# Patient Record
Sex: FEMALE | Race: ASIAN | Employment: FULL TIME | URBAN - METROPOLITAN AREA
[De-identification: names, ages, dates, MRNs, and addresses within clinical notes are randomized per-mention and may not be internally consistent; named-entity substitution may affect disease eponyms.]

---

## 2021-11-15 ENCOUNTER — TELEPHONE (OUTPATIENT)
Dept: FAMILY MEDICINE | Facility: CLINIC | Age: 51
End: 2021-11-15

## 2021-11-15 NOTE — TELEPHONE ENCOUNTER
Patient calling and stating she lives in the [facility] and is needing to complete an Immigration physical soon and is wanting to schedule this with Dr. Smyth. She would like a call in regards to what records she is needing for this physical? Patient is going to have what health information that she has with [facility] faxed to Dr. Smyth to be scanned into the chart. Janki Edmond LPN

## 2021-11-17 NOTE — TELEPHONE ENCOUNTER
Attempted to reach patient. Call was not answered, no voice mail.  An Stewart CMA on 11/17/2021 at 4:05 PM

## 2021-11-23 NOTE — TELEPHONE ENCOUNTER
Patient called back and she is scheduled 12/7 for a immigration physical. She is wondering what she needs for paperwork with. [phone] best number to reach her. She will have primary care MD send her immunization record to us.

## 2021-12-03 NOTE — TELEPHONE ENCOUNTER
Spoke to patient. Information given to patient as to what is needed when she comes in.   An Stewart, DARIO on 12/3/2021 at 4:31 PM

## 2021-12-07 ENCOUNTER — OFFICE VISIT (OUTPATIENT)
Dept: FAMILY MEDICINE | Facility: CLINIC | Age: 51
End: 2021-12-07

## 2021-12-07 VITALS
RESPIRATION RATE: 16 BRPM | HEART RATE: 78 BPM | HEIGHT: 65 IN | DIASTOLIC BLOOD PRESSURE: 56 MMHG | SYSTOLIC BLOOD PRESSURE: 98 MMHG | OXYGEN SATURATION: 100 % | BODY MASS INDEX: 19.26 KG/M2 | WEIGHT: 115.6 LBS | TEMPERATURE: 98.3 F

## 2021-12-07 DIAGNOSIS — Z02.89 HISTORY AND PHYSICAL EXAMINATION, IMMIGRATION: Primary | ICD-10-CM

## 2021-12-07 PROCEDURE — 99385 PREV VISIT NEW AGE 18-39: CPT | Performed by: FAMILY MEDICINE

## 2021-12-07 ASSESSMENT — MIFFLIN-ST. JEOR: SCORE: 1145.47

## 2021-12-07 ASSESSMENT — PAIN SCALES - GENERAL: PAINLEVEL: NO PAIN (0)

## 2021-12-07 NOTE — PROGRESS NOTES
"Magdiel is here for INS physical exam.  She moved from [place]  in 2016.  She lives in [place], [place] and is working as a professor at [employer]. Stated that overall she is doing well and has no concern today.  Denied of headache or dizziness.  No URI symptoms and denied of CP or SOB.  No fever or chill. No N/V/D/C.  No history of any of STD and denied of its risk.  No abnormal vaginal discharge or having rash in the pelvic area.  Denied of depression and has no history of depression, anxiety or mood disorder. No history or current homicidal or suicidal ideation.  No hallucination and has no history of psychiatric hospitalization.  No pain; no problem with sleeping.  Eating and drinking ok.  No problem with urination.  Denied of coughing or nigth sweat. No unintentional weight loss.  No exposure to TB or history of TB.  Has not had a positive PPD. Denied of drug use.      Problem list and histories reviewed & adjusted, as indicated.  Additional history: as documented      PAST MEDICAL HISTORY: History reviewed. No pertinent past medical history.    PAST SURGICAL HISTORY: History reviewed. No pertinent surgical history.    FAMILY HISTORY: History reviewed. No pertinent family history.    SOCIAL HISTORY:   Social History     Tobacco Use     Smoking status: Never Smoker     Smokeless tobacco: Never Used   Substance Use Topics     Alcohol use: Not Currently          Allergies   Allergen Reactions     Penicillins        No current outpatient medications on file.         ROS:  Constitutional, HEENT, cardiovascular, pulmonary, gi and gu systems are negative, except as otherwise noted.      OBJECTIVE:                                                      .Vitals: BP 98/56   Pulse 78   Temp 98.3  F (36.8  C) (Temporal)   Resp 16   Ht 1.643 m (5' 4.7\")   Wt 52.4 kg (115 lb 9.6 oz)   LMP  (LMP Unknown)   SpO2 100%   BMI 19.42 kg/m    BMI= Body mass index is 19.42 kg/m .     GENERAL: healthy, alert and no " distress  EYES: Eyes grossly normal to inspection, PERRL and conjunctivae and sclerae normal  HENT: ear canals and TM's normal, nose and mouth without ulcers or lesions.  Nares are non-congested. Oropharynx is pink and moist. No tender with palpation to the sinuses.  NECK: no adenopathy, no palpable masses, or scars and thyroid normal to palpation  RESP: lungs clear to auscultation - no rales, rhonchi or wheezes  CV: regular rate and rhythm, no murmur, no peripheral edema and peripheral pulses strong  ABDOMEN: soft, nontender, no hepatosplenomegaly or palpable masses and bowel sounds normal  MS: no gross musculoskeletal defects noted, no edema. Walk with no limping, normal gait. All 4 extremities are equally in strength. Ankle, knees, hips, shoulders, elbows and wrists exams normal. Normal fine motor skills on fingers. Back is straight, no lordosis or scoliosis. No tender with palpation.  SKIN: no suspicious lesions or rashes  NEURO: Normal strength and tone, mentation intact and speech normal  PSYCH: mentation appears normal, affect normal/bright. No hallucination, suicidal or homicidal ideation    Diagnostic Test Results:  No results found for any visits on 12/07/21.        ASSESSMENT/PLAN:                                                      1. Health examination of defined subpopulation  I personal reviewed the report of Medical Examination and Vaccination Record from the Department of [employer]. She has no chronic medical condition. Has not had a positive PPD test and she displayed no symptoms of active TB.  No exposure to TB that she knows of.  Checked the QuantiFERON level today. Denied any risk for STI, but will check for RPR (syphyllis) and gonorrhea. She has no history of depression, anxiety, mood disorder and has never been diagnosed or hospitalized for any psychiatric disorders.  She displayed no physical or mental disorders with associated harmful behavior. She denied of using drug. Reviewed her  immunization record, labs as ordered.  Will fill out her paper on her behalf once the results are available. Instructed not to open the sealed envelope.  All of her questions were answered and encouraged to call in if has any concern.    Also informed her that she should follow up with her primary provider for her routine and chronic medical care. I did not address any chronic medical problem today.  She understands.      ICD-10-CM    1. History and physical examination, immigration  Z02.89 Quantiferon-TB Gold Plus     Treponema Abs w Reflex to RPR and Titer     Neisseria gonorrhoeae PCR     Hepatitis Antibody A IgG     [place]MM[place] PHYSICAL         F/U as needed.    Horacio Cordon Mai, MD  [facility]

## 2021-12-12 ENCOUNTER — HEALTH MAINTENANCE LETTER (OUTPATIENT)
Age: 51
End: 2021-12-12

## 2021-12-13 ENCOUNTER — MYC MEDICAL ADVICE (OUTPATIENT)
Dept: FAMILY MEDICINE | Facility: CLINIC | Age: 51
End: 2021-12-13

## 2021-12-14 NOTE — TELEPHONE ENCOUNTER
The immigration physical forms were completed.  They are in my urgent box.  Please address it appropriately as soon as possible.  Please also let the patient know once it is ready to be picked up or mailed out.  Thank you.  Horacio

## 2021-12-14 NOTE — TELEPHONE ENCOUNTER
Replied to patient that forms are complete and ready to be picked up. These may be mailed also. Forms placed in second drawer of my desk.   An Stewart CMA on 12/14/2021 at 12:03 PM

## 2021-12-20 NOTE — TELEPHONE ENCOUNTER
Replied to patient via [employer]. Forms were brought to the mailroom for processing.   An Stewart CMA on 12/20/2021 at 7:33 AM

## 2022-10-03 ENCOUNTER — HEALTH MAINTENANCE LETTER (OUTPATIENT)
Age: 52
End: 2022-10-03

## 2023-02-11 ENCOUNTER — HEALTH MAINTENANCE LETTER (OUTPATIENT)
Age: 53
End: 2023-02-11

## 2024-03-09 ENCOUNTER — HEALTH MAINTENANCE LETTER (OUTPATIENT)
Age: 54
End: 2024-03-09